# Patient Record
Sex: FEMALE | Race: WHITE | URBAN - METROPOLITAN AREA
[De-identification: names, ages, dates, MRNs, and addresses within clinical notes are randomized per-mention and may not be internally consistent; named-entity substitution may affect disease eponyms.]

---

## 2017-01-25 ENCOUNTER — OFFICE VISIT (OUTPATIENT)
Dept: FAMILY MEDICINE | Facility: CLINIC | Age: 47
End: 2017-01-25
Payer: MEDICAID

## 2017-01-25 VITALS
HEIGHT: 64 IN | SYSTOLIC BLOOD PRESSURE: 110 MMHG | TEMPERATURE: 98.2 F | WEIGHT: 134 LBS | OXYGEN SATURATION: 97 % | RESPIRATION RATE: 16 BRPM | BODY MASS INDEX: 22.88 KG/M2 | HEART RATE: 81 BPM | DIASTOLIC BLOOD PRESSURE: 70 MMHG

## 2017-01-25 DIAGNOSIS — Z12.4 SCREENING FOR MALIGNANT NEOPLASM OF CERVIX: ICD-10-CM

## 2017-01-25 DIAGNOSIS — R19.00 PELVIC MASS: ICD-10-CM

## 2017-01-25 DIAGNOSIS — R19.09 UMBILICAL MASS: Primary | ICD-10-CM

## 2017-01-25 DIAGNOSIS — T83.39XA OTHER MECHANICAL COMPLICATION OF INTRAUTERINE CONTRACEPTIVE DEVICE, INITIAL ENCOUNTER: ICD-10-CM

## 2017-01-25 DIAGNOSIS — R63.4 UNINTENTIONAL WEIGHT LOSS: ICD-10-CM

## 2017-01-25 PROCEDURE — G0145 SCR C/V CYTO,THINLAYER,RESCR: HCPCS | Performed by: PHYSICIAN ASSISTANT

## 2017-01-25 PROCEDURE — 87624 HPV HI-RISK TYP POOLED RSLT: CPT | Performed by: PHYSICIAN ASSISTANT

## 2017-01-25 PROCEDURE — 99204 OFFICE O/P NEW MOD 45 MIN: CPT | Performed by: PHYSICIAN ASSISTANT

## 2017-01-25 NOTE — PATIENT INSTRUCTIONS
[facility]  [address]oor 1  [address] [place] [address]  Appointments: [phone] (imaging)

## 2017-01-25 NOTE — NURSING NOTE
"Chief Complaint   Patient presents with     Abdominal Pain     per patient last 3 months before menstural patient gets fever, chills, hot flashes, vomiting and diarrhea       Initial /70 mmHg  Pulse 81  Temp(Src) 98.2  F (36.8  C) (Tympanic)  Resp 16  Ht 5' 4\" (1.626 m)  Wt 134 lb (60.782 kg)  BMI 22.99 kg/m2  SpO2 97%  LMP 01/24/2017 (Exact Date) Estimated body mass index is 22.99 kg/(m^2) as calculated from the following:    Height as of this encounter: 5' 4\" (1.626 m).    Weight as of this encounter: 134 lb (60.782 kg).  BP completed using cuff size: wellington Manriquez CMA      "

## 2017-01-25 NOTE — PROGRESS NOTES
SUBJECTIVE:                                                    Darrel Castañeda is a 46 year old female who presents to clinic today for the following health issues:    Abdominal Pain      Duration: x3 months     Description (location/character/radiation): abdominal pain right above belly button, per patient last 3 months before menstural patient gets fever, chills, hot flashes, vomiting and diarrhea and stomach bulges out right above belly button        Associated flank pain: None    Intensity:  severe    Accompanying signs and symptoms:        Fever/Chills: YES       Gas/Bloating: YES- a little        Nausea/vomitting: YES- vomiting        Diarrhea: YES       Dysuria or Hematuria: no     History (previous similar pain/trauma/previous testing): none    Precipitating or alleviating factors:       Pain worse with eating/BM/urination: can be with BM        Pain relieved by BM: no     Therapies tried and outcome: None    LMP:  1/24/17       PROBLEMS TO ADD ON...  Per patient a history fibroids in 2003?  IUD placed about 2004 may still have it does not remember having removed         HPI additional notes:    Chief Complaint   Patient presents with     Abdominal Pain     per patient last 3 months before menstural patient gets fever, chills, hot flashes, vomiting and diarrhea     Darrel presents today with odd constellation of symptoms that occur just prior to her period starting. This has happened the past 3-4 cycles. Reports f/c/s, n/v/d for 2-3 days prior to menses and symptoms completely & spontaneously resolve as soon as menses begin. It first occurred in Oct and patient initially thought she had the stomach flu, but has happened every cycle since. LMP started yesterday, sx occurred for 3 days prior. Patient reports periods are now a little heavier and last a little longer, but not drastically so. Patient also notes firm mass just above her belly button that enlarges prior to menses, then shrinks back down again.  "Denies vaginal discharge or irritation. Patient hasn't had a well woman exam since IUD was placed 2004, does not recall ever getting IUD removed. Patient has known hx of fibroids since ?2003. Patient also mentions unintentional weight loss since Oct, reports loss of 23# since then.     ROS:  Skin: negative  Eyes: negative  Ears/Nose/Throat: negative  Respiratory: No shortness of breath, dyspnea on exertion, cough, or hemoptysis  Cardiovascular: negative  Gastrointestinal: as above  Genitourinary: as above  Musculoskeletal: negative  Neurologic: negative  Psychiatric: negative  Hematologic/Lymphatic/Immunologic: as above  Endocrine: as above    Chart Review:  History   Smoking status     Current Every Day Smoker     Types: Cigarettes   Smokeless tobacco     Not on file       No active problems.    History reviewed. No pertinent past surgical history.     Family History   Problem Relation Age of Onset     Dementia Mother      CEREBROVASCULAR DISEASE Father      KIDNEY DISEASE Father      Problem list, Medication list, Allergies, Medical/Social/Surg hx reviewed in [employer], updated as appropriate.   OBJECTIVE:                                                    /70 mmHg  Pulse 81  Temp(Src) 98.2  F (36.8  C) (Tympanic)  Resp 16  Ht 5' 4\" (1.626 m)  Wt 134 lb (60.782 kg)  BMI 22.99 kg/m2  SpO2 97%  LMP 01/24/2017 (Exact Date)  Body mass index is 22.99 kg/(m^2).  GENERAL: thin, in no acute distress  EYES: no discharge, no injection  HENT: Mucous mebranes moist, normocephalic  ABDOMEN: soft, NTND. Small non-incarcerated, reducible hernia associated with superior umbilicus; diastasis recti.  MS: no gross deformities noted. No CVA tenderness. No paralumbar tenderness.  SKIN: no suspicious lesions, no rashes  - female: Vaginal mucosa pink. Cervix- unable to visualize fully, no IUD strings visible, blood discharge. No adnexal tenderness. No cervical motion tenderness. Rt ovary normal to palpation; unable to palpate " Lt ovary; multiple large masses on uterus.  NEURO: A&Ox3, sensation grossly intact.  PSYCH: Able to articulate logical thoughts. Affect is normal.    Diagnostic test results: none     ASSESSMENT/PLAN:                                                          ICD-10-CM    1. Umbilical mass R19.09 US Abdomen Complete   2. Unintentional weight loss R63.4    3. Pelvic mass R19.00 US Pelvic Complete w Transvaginal   4. Screening for malignant neoplasm of cervix Z12.4 Pap imaged thin layer screen with HPV - recommended age 30 - 65 years (select HPV order below)     HPV High Risk Types DNA Cervical     Unable to get full visualization of cervical os given resistance from presumed fibroid uterus, patient discomfort with speculum, and bloody discharge. Did get view of multiple angles of cervix and os and unable to visualize IUD strings. Additionally, made several sweeps around cervical os and through discharge in an attempt to retrieve them, but came up empty-handed. Discussed IUD may simply still be in place in uterus and just strings retracted, however high concern for IUD migration into uterine wall. Discussed risks of leaving IUD unattended. Given constellation of symptoms, most concerning for underlying malignancy. Reassured patient masses felt in uterus consistent with fibroids but feel pelvic US is of utmost importance to both evaluate for possible malignancy and attempt to locate IUD. Suspect umbilical mass is hernia; discussed benign nature given reducible status, discussed s/s and prompt evaluation at ED if she suspects it becomes incarcerated, and decision whether to undergo elective repair. Again though, given possibility of IUD migration and malignancy, will also check abd US. Patient instructed to schedule US at her earliest convenience. If no evidence of underlying malignancy, consider w/u for thyroid or underlying metabolic d/o, though would be odd to just have sx around menses only. Pap collected today,  though somewhat blindly.    Please see patient instructions for treatment details.    Follow up with radiology as referred.  45 minutes total spent on this encounter, >50% spent in direct communication with the patient.    Cristy Cash PA-C  [facility]

## 2017-01-25 NOTE — Clinical Note
[address]  [address]  [facility] [address]  [phone]      February 1, 2017    Darrel Castañeda  [address]  [facility] [address]    Dear Darrel,  We are happy to inform you that your PAP smear result from 1/25/17 is normal.  We are now able to do a follow up test on PAP smears. The DNA test is for HPV (Human Papilloma Virus). Cervical cancer is closely linked with certain types of HPV. Your result showed no evidence of high risk HPV.  Therefore we recommend you return in 5 years for your next pap smear and HPV test.  You will still need to return to the clinic every year for an annual exam and other preventive tests.  Please contact the clinic with any questions.  Sincerely,  Cristy Cash PA-C/lizzy

## 2017-01-25 NOTE — MR AVS SNAPSHOT
"              After Visit Summary   1/25/2017    Darrel Castañeda    MRN: [id number]           Patient Information     Date Of Birth          1970        Visit Information        Provider Department      1/25/2017 3:10 PM Cristy Cash PA-C [facility]        Today's Diagnoses     Screening for malignant neoplasm of cervix    -  1     Umbilical mass         Pelvic mass           [facility]  [address]oor [address]  [address] [place] [address]  Appointments: [phone] (imaging)          Follow-ups after your visit        Future tests that were ordered for you today     Open Future Orders        Priority Expected Expires Ordered    US Pelvic Complete w Transvaginal Routine  1/25/2018 1/25/2017    US Abdomen Complete Routine  1/25/2018 1/25/2017            Who to contact     If you have questions or need follow up information about today's clinic visit or your schedule please contact [facility] directly at [phone].  Normal or non-critical lab and imaging results will be communicated to you by [employer], letter or phone within 4 business days after the clinic has received the results. If you do not hear from us within 7 days, please contact the clinic through [employer] or phone. If you have a critical or abnormal lab result, we will notify you by phone as soon as possible.  Submit refill requests through [employer] or call your pharmacy and they will forward the refill request to us. Please allow 3 business days for your refill to be completed.          Additional Information About Your Visit        [employer] Information     [employer] lets you send messages to your doctor, view your test results, renew your prescriptions, schedule appointments and more. To sign up, go to [email or web address] . Click on \"Log in\" on the left side of the screen, which will take you to the Welcome page. Then click on \"Sign up " "Now\" on the right side of the page.     You will be asked to enter the access code listed below, as well as some personal information. Please follow the directions to create your username and password.     Your access code is: [email or web address]  Expires: 2017  4:24 PM     Your access code will  in 90 days. If you need help or a new code, please call your [place] clinic or [phone].        Care EveryWhere ID     This is your Care EveryWhere ID. This could be used by other organizations to access your [place] medical records  [id number]        Your Vitals Were     Pulse Temperature Respirations    81 98.2  F (36.8  C) (Tympanic) 16    Height BMI (Body Mass Index) Pulse Oximetry    5' 4\" (1.626 m) 22.99 kg/m2 97%    Last Period          2017 (Exact Date)         Blood Pressure from Last 3 Encounters:   17 110/70    Weight from Last 3 Encounters:   17 134 lb (60.782 kg)              We Performed the Following     HPV High Risk Types DNA Cervical     Pap imaged thin layer screen with HPV - recommended age 30 - 65 years (select HPV order below)        Primary Care Provider    None       No address on file        Thank you!     Thank you for choosing [facility]  for your care. Our goal is always to provide you with excellent care. Hearing back from our patients is one way we can continue to improve our services. Please take a few minutes to complete the written survey that you may receive in the mail after your visit with us. Thank you!             Your Updated Medication List - Protect others around you: Learn how to safely use, store and throw away your medicines at www.disposemymeds.org.      Notice  As of 2017  4:24 PM    You have not been prescribed any medications.      "

## 2017-01-30 LAB
COPATH REPORT: NORMAL
PAP: NORMAL

## 2017-01-31 LAB
FINAL DIAGNOSIS: NORMAL
HPV HR 12 DNA CVX QL NAA+PROBE: NEGATIVE
HPV16 DNA SPEC QL NAA+PROBE: NEGATIVE
HPV18 DNA SPEC QL NAA+PROBE: NEGATIVE
SPECIMEN DESCRIPTION: NORMAL

## 2017-02-01 ENCOUNTER — TELEPHONE (OUTPATIENT)
Dept: FAMILY MEDICINE | Facility: CLINIC | Age: 47
End: 2017-02-01

## 2017-02-01 DIAGNOSIS — Z53.8 UNSUCCESSFUL ATTEMPT TO REMOVE INTRAUTERINE DEVICE (IUD): ICD-10-CM

## 2017-02-01 DIAGNOSIS — Z97.5 UNSUCCESSFUL ATTEMPT TO REMOVE INTRAUTERINE DEVICE (IUD): ICD-10-CM

## 2017-02-01 DIAGNOSIS — A59.01 TRICHOMONAL VAGINITIS: Primary | ICD-10-CM

## 2017-02-01 DIAGNOSIS — D25.9 UTERINE LEIOMYOMA, UNSPECIFIED LOCATION: Primary | ICD-10-CM

## 2017-02-01 DIAGNOSIS — K42.9 UMBILICAL HERNIA WITHOUT OBSTRUCTION AND WITHOUT GANGRENE: ICD-10-CM

## 2017-02-01 PROBLEM — D25.1 INTRAMURAL LEIOMYOMA OF UTERUS: Status: ACTIVE | Noted: 2017-02-01

## 2017-02-01 RX ORDER — METRONIDAZOLE 500 MG/1
2000 TABLET ORAL ONCE
Qty: 4 TABLET | Refills: 1 | Status: SHIPPED | OUTPATIENT
Start: 2017-02-01 | End: 2017-02-01

## 2017-02-01 NOTE — TELEPHONE ENCOUNTER
Informed of trichomonas seen on recent Pap. Contacted patient regarding result, discussed pathophysiology, and that it may be triggering some of the symptoms she has around her period. Still recommend gyn f/u due to multiple fibroids, as this is likely contributing to heavier periods (in addition to inability to remove IUD in clinic here). Will treat with 2g dose Flagyl, given additional refill so partner may be treated as well. Will forward note to Tamika Garcia CNP, whom patient is seeing this afternoon regarding her fibroids and IUD (per patient request).

## 2017-02-01 NOTE — TELEPHONE ENCOUNTER
Informed patient of message below.   She agreed to schedule appt with Gynecologist.  Pt is also interested on referral for general surgery for hernia repair. Please advice on referral.  Ok to leave a detailed message with referral at home phone number.

## 2017-02-01 NOTE — TELEPHONE ENCOUNTER
Please contact patient regarding her recent US:    - Your abdominal US confirms a hernia just above your belly button. The hernia is fairly small (1 cm wide) and non-incarcerated (trapped), therefore we do not need to do anything about it right now. If you would like to discuss possible hernia repair surgery, I will gladly placed a referral to our general surgery department for you.    - Re: pelvic US: the IUD is inside the uterus but they are unable to tell if it's burrowed into the uterus wall. You do have several large fibroids, which is what we were expecting. However, these are distorting the shape of the uterus, so it was difficult to see if the IUD was sitting inside the uterus or was inside the wall of the uterus. I recommend you discuss both IUD removal (as it may be a bit difficult due the fibroids) and management of the fibroids with a gynecologist, I have placed a referral for you to [facility] near [place]. You may call  [phone] to schedule an appointment. There was a fluid-filled cyst on the right, the radiologist suspects this was a simple cyst from the ovary but they were unable to see either ovary on the scan due to the enlarged uterus.

## 2017-02-07 ENCOUNTER — TELEPHONE (OUTPATIENT)
Dept: FAMILY MEDICINE | Facility: CLINIC | Age: 47
End: 2017-02-07

## 2017-02-07 NOTE — TELEPHONE ENCOUNTER
Patient called clinic stating her [facility] does not have Rx for metroNIDAZOLE (FLAGYL) 500 MG tablet.  [facility] was called, they have received the Rx and just need her insurance information.   Patient was called and informed to contact pharmacy.

## 2017-02-09 ENCOUNTER — OFFICE VISIT (OUTPATIENT)
Dept: SURGERY | Facility: CLINIC | Age: 47
End: 2017-02-09

## 2017-02-09 VITALS
HEIGHT: 64 IN | DIASTOLIC BLOOD PRESSURE: 82 MMHG | OXYGEN SATURATION: 99 % | TEMPERATURE: 98.3 F | SYSTOLIC BLOOD PRESSURE: 108 MMHG | HEART RATE: 88 BPM | BODY MASS INDEX: 23.54 KG/M2 | WEIGHT: 137.9 LBS

## 2017-02-09 DIAGNOSIS — K42.9 UMBILICAL HERNIA WITHOUT OBSTRUCTION AND WITHOUT GANGRENE: Primary | ICD-10-CM

## 2017-02-09 ASSESSMENT — PAIN SCALES - GENERAL: PAINLEVEL: NO PAIN (0)

## 2017-02-09 NOTE — PROGRESS NOTES
"General Surgery Consultation Note    I was asked to see this patient for the following problem:    CC: Abdominal hernia    HPI:  Darrel Castañeda is a 46 year old female with a several month history of abdominal bulge and pain. The patient reports that fairly cyclically around her menstrual cycle she gets abdominal pain just above the umbilicus, fevers, chills and nausea. She reports noticing the bulge in October 2016 and has not noticed any significant change in size. Intermittently it becomes hard and more painful. She denies have obstructive symptoms.     Past Medical History:  History reviewed. No pertinent past medical history.  Patient Active Problem List   Diagnosis     Intramural leiomyoma of uterus     Umbilical hernia without obstruction and without gangrene       Surgical History:  History reviewed. No pertinent past surgical history.    Medications:  Prior to Admission medications    Not on File     No current outpatient prescriptions on file.       Allergies:  No Known Allergies    Social History:  Social History     Social History     Marital Status: Single     Spouse Name: N/A     Number of Children: N/A     Years of Education: N/A     Social History Main Topics     Smoking status: Current Every Day Smoker     Types: Cigarettes     Smokeless tobacco: None     Alcohol Use: Yes      Comment: socially      Drug Use: Yes      Comment: marijuana      Sexual Activity:     Partners: Male     Birth Control/ Protection: Condom     Other Topics Concern     None     Social History Narrative       Family History:  Family History   Problem Relation Age of Onset     Dementia Mother      CEREBROVASCULAR DISEASE Father      KIDNEY DISEASE Father        Review of Systems:  A 14 point review of systems was reviewed in our paper questionnaire.     Physical Examination:  Vital Signs: /82 mmHg  Pulse 88  Temp(Src) 98.3  F (36.8  C) (Oral)  Ht 5' 4\"  Wt 137 lb 14.4 oz  BMI 23.66 kg/m2  SpO2 99%  LMP 01/24/2017 " (Exact Date)  HEENT: NCAT; MMM; EOMSI; PERRL  Lungs: Breathing unlabored  Abdomen: soft/nontender/nondistended, small 1-2cm supraumbilical hernia palpated, soft, no evidence of bowel within    Imaging:  IMPRESSION: 1 cm supraumbilical hernia containing approximately 2 cm  of fat. No herniated bowel.      Assessment:  47 yo female with small supraumbilical hernia    Discussion of Risks:   The risks of hernia repair were reviewed with the patient.    These risks combine the risks of abdominal surgery and the risks of hernia repair, including mesh implantation, and were described to the patient as follows:    Abdominal surgery risks:    These include, but are not limited to, death, myocardial infarction, pneumonia, urinary tract infection, deep venous thrombosis with or without pulmonary embolus, abdominal infection from bowel injury or abscess, bowel obstruction, wound infection, and bleeding.    Hernia repair risks:    Food and Drug Administration Comments on Hernia Repair Surgery and Mesh Implantation.    http://www.fda.gov/MedicalDevices/ProductsandMedicalProcedures/ImplantsandProsthetics/HerniaSurgicalMesh/default.htm      Hernia Repair Complications    Based on FDA s analysis of medical device adverse event reports and of peer-reviewed, scientific literature, the most common adverse events for all surgical repair of hernias--with or without mesh--are pain, infection, hernia recurrence, scar-like tissue that sticks tissues together (adhesion), blockage of the large or small intestine (obstruction), bleeding, abnormal connection between organs, vessels, or intestines (fistula), fluid build-up at the surgical site (seroma), and a hole in neighboring tissues or organs (perforation).    The most common adverse events following hernia repair with mesh are pain, infection, hernia recurrence, adhesion, and bowel obstruction. Some other potential adverse events that can occur following hernia repair with mesh are mesh  migration and mesh shrinkage (contraction).    Many complications related to hernia repair with surgical mesh that have been reported to the FDA have been associated with recalled mesh products that are no longer on the market. Pain, infection, recurrence, adhesion, obstruction, and perforation are the most common complications associated with recalled mesh. In the FDA s analysis of medical adverse event reports to the FDA, recalled mesh products were the main cause of bowel perforation and obstruction complications.    Please refer to the recall notices here and here for more information if you have recalled mesh. For more information on the recalled products, please visit the FDA Medical Device Recall website. Please visit the Medical & Radiation Emitting Device Database to search a specific type of surgical mesh.    If you are unsure about the specific mesh  and brand used in your surgery and have questions about your hernia repair, contact your surgeon or the facility where your surgery was performed to obtain the information from your medical record.         Plan:  -PAC visit pre-operatively  -[place] [facility]  -Discussed that the hernia is unlikely to be contributing to her cyclical complaints, fevers, chills, etc and repairing of the hernia will not resolve those symptoms. Further investigation as to the etiology of these symptoms would be beneficial.   -Discussed that the patient was increased risk of post-operative complications, repair failure, and infection due to her current smoking habits. Recommend complete smoking cessation prior to repair.       Gilma Dela Cruz MD      Physician Attestation  I, Mo Field, saw and evaluated this patient as part of a shared visit.  I have reviewed and discussed with the advanced practice provider and/or resident their history, physical and plan.    I personally reviewed the vital signs, medications, labs and imaging.    My key history or  physical exam findings: has small supraumbilical hernia; seems separate from primary umbilical location.  Will likely need mesh; not dangerous to wait on this due to its contents (fat).    Key management decisions made by me: open umbilical hernia repair.  Same day surgery; 45 minutes; ASC.  GETA.  PAC clinic for preop H&P.    Mo Field MD  Date of Service (when I saw the patient): 02/09/2017

## 2017-02-09 NOTE — Clinical Note
Darrel Castañeda  [address]  [facility] [address]      SURGERY PACKET            Your surgery is scheduled:    Date: Call Farhan - [phone] after you have ceased all Nicotine use.  ________________________________    Time: Per Farhan  ________________________________    Please arrive at:  2 hours  ______________________    Surgeon's Name: Uri Field  _______________________        Pre-Op Physical Fax Numbers:          [facility] Pre-Admissions  Fax: [phone]  Phone: [phone]        Your surgery is located at:  [facility]  [address] [place] [address]  [phone]       Before Your Surgery  For Patients and Visitors at [facility]    Welcome  As you get ready for surgery, you may have a lot of questions.  This brochure will help you know what to expect before and after surgery.  You and your family are the most important members of your health care team.  You will need to take an active role in your care.    Be sure to ask questions and learn all that you can about your surgery.  If you have any safety concerns, we urge you to tell a nurse as soon as possible.   This brochure is for information only.  It does not replace the advice of your doctor.  Always follow your doctor's advice.    Please tell us if you need a .    GETTING READY FOR SURGERY  Always follow your surgeon's instructions.  If you don't, your surgery could be canceled.  Please use the following checklist.    Within 30 Days of Surgery:    Have a pre-surgery physical exam with your family doctor or partner.    If you use a [facility], all of your information from the pre-op physical will be in the [employer] computer system.    Ask the doctor to send all of your results to the hospital before the surgery.  The doctor also may ask you to bring the results with you on the day of surgery or you can fax them to [phone].  Tell the doctor if:    You are allergic to  latex or rubber  (Latex and rubber gloves are often used in medical care).    You are taking any medicines (including aspirin), vitamins (Vitamin E, Fish Oil, Omegas) or herbal products.  You will need to stop taking some medicines before surgery.    You have any medical problems (allergies, diabetes or heart disease, for example).    You have a pacemaker or an AICD (automatic implanted cardiac defibrillator).  If you do, please bring the ID card with you on the day of surgery.    You are a smoker.  People who smoke have a higher risk of infection after surgery.  Ask your doctor how you can quit smoking.  Within 7 days of Surgery:    Pre-register with the hospital.  Please use the hospital's phone number listed on the first page of this brochure.  Or, to register online, go to [email or web address].      Prior to your surgical procedure, a nurse will be contacting you to obtain a health history ([phone]).  Additionally, someone from the Admissions Department will also contact you for preregistration ([phone]).      Call your insurance company.  Ask if you need pre-approval for your surgery.  If you do not have insurance, please let us know.      Arrange for someone to drive you home after surgery.  If you will have same-day surgery, you may not drive or take public transportation home by yourself.    Arrange for someone to stay with you for 24 hours after you go home.  This person must be a responsible adult (ie- Family member or friend).  The Day Before Surgery:     Call your surgeon if there are any changes in your health.  This includes signs of a cold or flu (such as a sore throat, runny nose, cough, rash or fever).    Do not smoke, drink alcohol or take over-the-counter medicine (unless your surgeon tells you to) for 24 hours before and after surgery.    If you take prescribed drugs:  You may need to stop them until after the surgery.  Follow your doctor's orders.  You may resume Aspirin and/or blood  thinners after your surgery as directed by your physician/surgeon.    NO FOOD OR DRINK AFTER MIDNIGHT.  Follow your surgeon's orders for eating and drinking.  You need to have an empty stomach before surgery.  This will make the surgery as safe as possible.  If you don't follow your doctor's orders, your surgery could be changed to another date.  A nurse will call you within a few days of surgery to go over these and other instructions.  If you do not hear from them, please call them at [phone]  The Day of Surgery:    Take a shower or bath the night before and the morning of surgery.  Use antiseptic soap or the soap your surgeon gave you.  Gently clean the skin.  Do not shave or scrub your surgery site.    Please remove deodorant, cologne, scented lotion, makeup, nail polish and jewelry (including rings and body piercings).  If you wear artificial nails, please remove at least one nail before coming to the hospital.    Wear clean, loose clothing to the hospital.    Bring these items to the hospital:  1. Your insurance card.  2. Money for parking and co-pays (for medicines or the surgery), if needed.   3. A list of all the medicines you take.  Include vitamins, minerals, herbs and over-the-counter drugs.  Note any drug allergies.  4. A copy of your advance health care directive, if you have one.  This tells us what treatment you would want -- and who would make health care decisions -- if you could no longer speak for yourself.  You may request this form in advance or download it from [email or web address].  5. A case for any glasses, contact lenses, hearing aids or dentures.  6. Your inhaler or CPAP machine, if you use these at home.  Leave extra cash, jewelry and other valuables at home.    When You Arrive:  When you get to the hospital, you will:    Check in.  If you are under age 18, you must be with a parent or legal guardian.    Sign consent forms, if you haven't already.  These forms state that you  know the risks and benefits of surgery.  When you sign the forms, you give us permission to do the surgery.  Do not sign them unless you understand what will happen during and after your surgery.  If you have any questions about your surgery, ask to speak with your doctor before you sign the forms.  If you don't understand the answers, ask again.    Receive a copy of the Patients Bill of Rights.  If you do not receive a copy, please ask for one.    Change into hospital clothes.  Your belongings will be placed in a bag.  We will return them to you after surgery.    Meet with the anesthesia provider.  He or she will tell you what kind of anesthesia (medicine) will be used to keep you comfortable during surgery.  Remember: It's okay to remind doctors and nurses to wash their hands before touching you.   In most cases, your surgeon will use a marker to write his or her initials on the surgery site.  This ensures that the exact site is operated on.  For safety reasons, we will ask you the same questions many times.  For example, we may ask your name and birth date over and over again.  Friends and family can stay with you until it's time for surgery.  While you're in surgery, they will be in the waiting area.  Please note that cell phones are not allowed in some patient care areas.  If you have questions about what will happen in the operating room, talk to your care team.  After Surgery:    We will move you to a recovery room where we will watch you closely.  If you have any pain or discomfort, tell your nurse.  He or she will try to make you comfortable.      If you are staying overnight we will move you to your hospital room after you are awake.    If you are going home we will move you to another room.  Friends and family may be able to join you.  The length of time you spend in recovery depends on the type of medicine you received, your medical condition, and the type of surgery you had.  Dealing with pain:  A nurse  "will check your comfort level often during your stay.  He or she will work with you to manage your pain.  Remember:    All pain is real.  There are many ways to control pain.  We can help you decide what works best for you.    Ask for pain medicine when you need it.  Don't try to \"tough it out\" -- this can make you feel worse.  Always take your medicine as ordered.    Medicine doesn't work the same for everyone.  If your medicine isn't working tell your nurse.  There may be other medicines or treatments we can try.  Going Home:  We will let you know when you're ready to leave the hospital.  Before you leave, we will tell you how to care for yourself at home and prevent infections.  If you do not understand something, please say so.  We will answer any questions you have.  We will then help you get ready to leave.  You must have an adult with you for the first 24 hours after you leave the hospital. Take it easy when you get home.  You will need some time to recover -- you may be more tired than you realize at first.  Rest and relax for at least the first 24 hours at home.  You'll feel better and heal faster if you take good care of yourself.                      Pre-Operative Surgery Scrub    Purpose:   The skin harbors a large variety of bacteria, both infectious and noninfectious.  Showering with an antiseptic soap prior to an invasive procedure will decrease the number of transient and resident (good and bad) bacteria that is normally found on the skin.    Procedure:      Shower or bathe with 1/2 of the bottle of antiseptic soap (enclosed) the evening before and 1/2 of the bottle the morning of surgery (bathing the day of the procedure is most important).       Apply the soap at full strength (use the entire bottle).  Gently clean the skin, rinse, and dry with a clean towel that is freshly laundered (out of the dryer) and then put on clean clothing that is freshly laundered.        We have given you information " regarding pre-op showering.  We recommend that patients wash with an antiseptic soap prior to surgery.  This cleanser will be given to you at the clinic or mailed to your home.  It is advised that you liberally wash the specific area surgery area the night before, and again in the morning before the surgery.  Do not apply lotion afterward.  We would like to keep the skin as clean as possible.    Thank you for following these important instructions.      You have been scheduled for surgery and we would like to give you some information that will assist in helping get the best possible outcome.      Before Surgery:   If for any reason you decide not to have the surgery, please contact our office.  We can easily cancel or reschedule the procedure. Please call the  at [phone].      Any pain related to the surgery that occurs before the surgery needs to be reported and managed by your primary care or referring doctor.      Please keep in mind that the time of surgery is subject to change.  Make sure you have nothing to eat or drink after midnight.  If your surgery is later in the afternoon, this recommendation might change, but not until the day before surgery after the actual time of the surgery has been established.    After Surgery:  When you are discharged from the recovery room, the nurses will review instructions with you and your caregiver.      Please wash your hands every time you touch the wound or change bandages or dressings.      Do not submerge the wound in water.  You may not use a bathtub or hot tub until the wound is closed.  The wait time frame is generally 2-3 weeks but any open area can be a source of incoming bacteria, so it is better to be on the safe side and avoid the tub until your wound is fully healed.      You may take a shower 24 hours after surgery.  Double check with your surgeon if it is ok for water to run over a wound, whether it has been sutured, stapled, glued or is open.   You may gently wash the wound using the antiseptic soap provided for your pre-surgery showering (do not use a washcloth).  Any mild soap will work as well.      Many surgical wounds will have small white strips of tape on them called Steri Strips.  Do not remove these.  The edges will curl and fall off within 7-10 days with normal showering.      If you are going home with sutures (stitches) or staples, you must return to the clinic to have them taken out, usually within 1-2 weeks.      Signs and symptoms of infection include:  1. Fever, temperature over 101.5 ' F  2. Redness  3. Swelling  4. Increasing pain  5. Green or yellow drainage which may or may not have a foul odor.    Symptoms of infection need to be reported to your surgery office. Please call the nurse at [phone], Option #3.    If you or your  are deaf or hard of hearing, or prefer a language other than English, please let us know.  We have many free services, including interpreters and other aids to help you communicate. You may ask for help  through any member of your care team or by calling Language Services at [phone], option 2.

## 2017-02-09 NOTE — NURSING NOTE
"Chief Complaint   Patient presents with     Consult     Umbilical hernia- Internal [facility] referral , imaging in [facility]       Filed Vitals:    02/09/17 1056   BP: 108/82   Pulse: 88   Temp: 98.3  F (36.8  C)   TempSrc: Oral   Height: 5' 4\"   Weight: 137 lb 14.4 oz   SpO2: 99%       Body mass index is 23.66 kg/(m^2).  Mahin Evans CMA                      "

## 2017-02-09 NOTE — NURSING NOTE
This patient is having Open Umbilical Hernia Repair by Dr. Field.    The following handouts were reviewed with the patient :  Before Your Surgery, Patient Checklist, Preop Recommendations Quick Reference Guide, History and Physical, Blood Bank Preadmission Order, Medications to Avoid, Shower or Bathing Before Surgery, Powerful Choices and [place] Advance Health Care Directive.  Questions were addressed and understanding of content was verbalized.  Contact information was provided.      Pt to call Farhan for surgery date and PAC.  Pt must cease all nicotine use prior to scheduling surgery.    Time with pt - 15 min    Shelby Wolf RN

## 2017-02-09 NOTE — Clinical Note
2/9/2017       RE: Darrel Castañeda  [address]  [facility] [address]     Dear Colleague,    Thank you for referring your patient, Darrel Castañeda, to the [facility] GENERAL SURGERY at [facility]. Please see a copy of my visit note below.    General Surgery Consultation Note    I was asked to see this patient for the following problem:    CC: Abdominal hernia    HPI:  Darrel Castañeda is a 46 year old female with a several month history of abdominal bulge and pain. The patient reports that fairly cyclically around her menstrual cycle she gets abdominal pain just above the umbilicus, fevers, chills and nausea. She reports noticing the bulge in October 2016 and has not noticed any significant change in size. Intermittently it becomes hard and more painful. She denies have obstructive symptoms.     Past Medical History:  History reviewed. No pertinent past medical history.  Patient Active Problem List   Diagnosis     Intramural leiomyoma of uterus     Umbilical hernia without obstruction and without gangrene       Surgical History:  History reviewed. No pertinent past surgical history.    Medications:  Prior to Admission medications    Not on File     No current outpatient prescriptions on file.       Allergies:  No Known Allergies    Social History:  Social History     Social History     Marital Status: Single     Spouse Name: N/A     Number of Children: N/A     Years of Education: N/A     Social History Main Topics     Smoking status: Current Every Day Smoker     Types: Cigarettes     Smokeless tobacco: None     Alcohol Use: Yes      Comment: socially      Drug Use: Yes      Comment: marijuana      Sexual Activity:     Partners: Male     Birth Control/ Protection: Condom     Other Topics Concern     None     Social History Narrative       Family History:  Family History   Problem Relation Age of Onset     Dementia Mother      CEREBROVASCULAR DISEASE Father      KIDNEY  "DISEASE Father        Review of Systems:  A 14 point review of systems was reviewed in our paper questionnaire.     Physical Examination:  Vital Signs: /82 mmHg  Pulse 88  Temp(Src) 98.3  F (36.8  C) (Oral)  Ht 5' 4\"  Wt 137 lb 14.4 oz  BMI 23.66 kg/m2  SpO2 99%  LMP 01/24/2017 (Exact Date)  HEENT: NCAT; MMM; EOMSI; PERRL  Lungs: Breathing unlabored  Abdomen: soft/nontender/nondistended, small 1-2cm supraumbilical hernia palpated, soft, no evidence of bowel within    Imaging:  IMPRESSION: 1 cm supraumbilical hernia containing approximately 2 cm  of fat. No herniated bowel.      Assessment:  47 yo female with small supraumbilical hernia    Discussion of Risks:   The risks of hernia repair were reviewed with the patient.    These risks combine the risks of abdominal surgery and the risks of hernia repair, including mesh implantation, and were described to the patient as follows:    Abdominal surgery risks:    These include, but are not limited to, death, myocardial infarction, pneumonia, urinary tract infection, deep venous thrombosis with or without pulmonary embolus, abdominal infection from bowel injury or abscess, bowel obstruction, wound infection, and bleeding.    Hernia repair risks:    Food and Drug Administration Comments on Hernia Repair Surgery and Mesh Implantation.    http://www.fda.gov/MedicalDevices/ProductsandMedicalProcedures/ImplantsandProsthetics/HerniaSurgicalMesh/default.htm      Hernia Repair Complications    Based on FDA s analysis of medical device adverse event reports and of peer-reviewed, scientific literature, the most common adverse events for all surgical repair of hernias--with or without mesh--are pain, infection, hernia recurrence, scar-like tissue that sticks tissues together (adhesion), blockage of the large or small intestine (obstruction), bleeding, abnormal connection between organs, vessels, or intestines (fistula), fluid build-up at the surgical site (seroma), and a " hole in neighboring tissues or organs (perforation).    The most common adverse events following hernia repair with mesh are pain, infection, hernia recurrence, adhesion, and bowel obstruction. Some other potential adverse events that can occur following hernia repair with mesh are mesh migration and mesh shrinkage (contraction).    Many complications related to hernia repair with surgical mesh that have been reported to the FDA have been associated with recalled mesh products that are no longer on the market. Pain, infection, recurrence, adhesion, obstruction, and perforation are the most common complications associated with recalled mesh. In the FDA s analysis of medical adverse event reports to the FDA, recalled mesh products were the main cause of bowel perforation and obstruction complications.    Please refer to the recall notices here and here for more information if you have recalled mesh. For more information on the recalled products, please visit the FDA Medical Device Recall website. Please visit the Medical & Radiation Emitting Device Database to search a specific type of surgical mesh.    If you are unsure about the specific mesh  and brand used in your surgery and have questions about your hernia repair, contact your surgeon or the facility where your surgery was performed to obtain the information from your medical record.         Plan:  -PAC visit pre-operatively  -Plan for hernia repair at [facility]  -Discussed that the hernia is unlikely to be contributing to her cyclical complaints, fevers, chills, etc and repairing of the hernia will not resolve those symptoms. Further investigation as to the etiology of these symptoms would be beneficial.   -Discussed that the patient was increased risk of post-operative complications, repair failure, and infection due to her current smoking habits. Recommend complete smoking cessation prior to repair.       Gilma Dela Cruz MD      Physician Attestation  I,  Mo Field, saw and evaluated this patient as part of a shared visit.  I have reviewed and discussed with the advanced practice provider and/or resident their history, physical and plan.    I personally reviewed the vital signs, medications, labs and imaging.    My key history or physical exam findings: has small supraumbilical hernia; seems separate from primary umbilical location.  Will likely need mesh; not dangerous to wait on this due to its contents (fat).    Key management decisions made by me: open umbilical hernia repair.  Same day surgery; 45 minutes; ASC.  GETA.  PAC clinic for preop H&P.    Mo Field MD  Date of Service (when I saw the patient): 02/09/2017

## 2017-02-14 ENCOUNTER — OFFICE VISIT (OUTPATIENT)
Dept: OBGYN | Facility: CLINIC | Age: 47
End: 2017-02-14
Attending: OBSTETRICS & GYNECOLOGY
Payer: MEDICAID

## 2017-02-14 VITALS
DIASTOLIC BLOOD PRESSURE: 77 MMHG | HEART RATE: 66 BPM | WEIGHT: 136.9 LBS | SYSTOLIC BLOOD PRESSURE: 111 MMHG | BODY MASS INDEX: 23.37 KG/M2 | HEIGHT: 64 IN

## 2017-02-14 DIAGNOSIS — Z30.432 ENCOUNTER FOR IUD REMOVAL: Primary | ICD-10-CM

## 2017-02-14 PROCEDURE — 58300 INSERT INTRAUTERINE DEVICE: CPT | Mod: ZF | Performed by: OBSTETRICS & GYNECOLOGY

## 2017-02-14 PROCEDURE — 58301 REMOVE INTRAUTERINE DEVICE: CPT | Mod: ZF | Performed by: OBSTETRICS & GYNECOLOGY

## 2017-02-14 PROCEDURE — 99213 OFFICE O/P EST LOW 20 MIN: CPT | Mod: ZF

## 2017-02-14 PROCEDURE — 25000125 ZZHC RX 250: Mod: ZF

## 2017-02-14 ASSESSMENT — ENCOUNTER SYMPTOMS: DYSURIA: 0

## 2017-02-14 NOTE — LETTER
"2017       RE: Darrel Castañeda  [address]  [facility] [address]     Dear Colleague,    Thank you for referring your patient, Darrel Castañeda, to the [facility] at [facility]. Please see a copy of my visit note below.    SUBJECTIVE: Darrel Castañeda is a 46 year old , requests IUD removal and re-insertion.    Verification of Procedure:  Just prior the procedure through verbal and active participation of team members, I verified:     Initials   Patient Name mkm   Patient  mkm   Procedure to be performed mkm     Consent:  Risks, benefits of treatment, and alternative options for contraception were discussed.  Patient's questions were elicited and answered.  Written consent was obtained and scanned into medical record.     OBJECTIVE: /77  Pulse 66  Ht 1.626 m (5' 4\")  Wt 62.1 kg (136 lb 14.4 oz)  LMP 2017 (Exact Date)  BMI 23.5 kg/m2    Pelvic Exam:  EG/BUS: Normal genital architecture without lesions, erythema or abnormal secretions. Bartholin's, Urethra, [place]'s glands are normal.   Vagina: moist, pink, rugae with creamy, white and odorlesssecretions  Cervix: no lesions  Uterus: anteverted,    Adnexa: Within normal limits and No masses, nodularity, tenderness  Rectum: anus normal      PROCEDURE NOTE  --  Mirena Removal and Re-Insertion    Reason for Removal and Re-insertion:  contraception.    Pregnancy test: N/a    Counseling:  Patient counseled on efficacy, benefits, risks, potential side effects of IUD.  Insertion procedure and risk of infection, perforation, and spontaneous expulsion reviewed.   Advised to plan removal and/or replacement of IUD in 6 years from today or when desired.       Darrel  was not pre-medicated.    Mirena IUD removal procedure:    Cervix was visualized with a medium Graves speculum and prepped with. The strings were grasped with a uterine packing forceps and the IUD removed with gentle " traction.  No resistance was encountered.  The Mirena IUD was noted to be intact.  There were no complications.  Darrel Castañeda reported no pain.  There was no bleeding.     Re-insertion procedure  Under sterile technique, cervix was visualized with a medium Graves speculum and prepped with Betadine solution. The uterus was sounded to 3 cm. The Mirena IUD insertion apparatus was prepared and placed through the cervix without significant resistance and deployed at the fundus in the usual fashion. The strings were trimmed 3 cm from the external os.      Device Lot #: [id number]  Device Expiration Date: 920     EBL:  Minimal     Complications:  None      Darrel Castañeda tolerated procedure well.    PLAN:      Written information on IUD use reviewed and given.  Symptoms to report reviewed. To report heavy bleeding, severe cramping, or abnormal vaginal discharge.  May take Ibuprofen 400-800 mg PO TID PRN or Naproxen 500 mg PO BID for cramping. Reminded to check for IUD strings every month.  Patient has been counseled to use backup birth control method for 1 week.  Return to clinic in 4-6 weeks for string check. Darrel Castañeda  verbalized understanding of instructions.    MD Adriana      Progress Note    SUBJECTIVE:  Darrel is a  46 YOF with a history of uterine fibroids who comes to clinic today to discuss abdominal complaints related to her periods, as well as to have her Mirena IUD removed. She notes that for the last 3 months she's had fevers/chills, abdominal pain and vomiting in the 3 days preceding her menstrual periods. The abdominal pain and vomiting are debilitating, but resolve after her periods begin. She has never had symptoms like this before. She has not had any menorrhalgia or changes in her menstrual cycle flow, which are otherwise regular. No spotting or bleeding between cycles. She tried using ASA on one occassion but threw it up.    For the IUD, she had a Merena IUD placed in , and  would like to have it removed today. She had a transvaginal US on  which showed the IUD within the uterus, but an attempt to remove the IUD was unsuccessful.     Menstrual History:  Menstrual History 2017   LAST MENSTRUAL PERIOD 2017       Last    Lab Results   Component Value Date    PAP OTHER-NIL, See Result 2017     History of abnormal Pap smear: NO - age 30-65 PAP every 5 years with negative HPV co-testing recommended    Last   Lab Results   Component Value Date    HPV16 Negative 2017     Last   Lab Results   Component Value Date    HPV18 Negative 2017     Last   Lab Results   Component Value Date    HRHPV Negative 2017     Last Colonoscopy:  No results found for this or any previous visit.    HISTORY:  Prescription Medications as of 2017             ASPIRIN PO         Allergies   Allergen Reactions     Shellfish-Derived Products Anaphylaxis       There is no immunization history on file for this patient.    Obstetric History       T2      TAB0   SAB0   E0   M0   L2      No past medical history on file.  No past surgical history on file.  Family History   Problem Relation Age of Onset     Dementia Mother      CEREBROVASCULAR DISEASE Father      KIDNEY DISEASE Father      Social History     Social History     Marital status: Single     Spouse name: N/A     Number of children: N/A     Years of education: N/A     Social History Main Topics     Smoking status: Current Every Day Smoker     Types: Cigarettes     Smokeless tobacco: Not on file     Alcohol use Yes      Comment: socially      Drug use: Yes      Comment: marijuana      Sexual activity: Yes     Partners: Male     Birth control/ protection: Condom     Other Topics Concern     Not on file     Social History Narrative       Review of Systems     Genitourinary:  Negative for dysuria, vaginal discharge, voiding less frequently, menstrual changes and vaginal dryness.       EXAM:  Blood pressure 111/77,  "pulse 66, height 1.626 m (5' 4\"), weight 62.1 kg (136 lb 14.4 oz), last menstrual period 01/24/2017. Body mass index is 23.5 kg/(m^2).  General - pleasant female in no acute distress.    Pelvic Exam:  EG/BUS: Normal genital architecture without lesions, erythema or abnormal secretions Bartholin's, Urethra, [place]'s normal  Urethral meatus: normal  Urethra: no masses, tenderness, or scarring  Vagina: moist, pink, rugae with creamy, white and odorless  secretions  Cervix: no lesions. IUD strings visualized at cervical os.  Uterus: anteverted,  and nodular 9 wk size  Adnexa: Within normal limits and No masses, nodularity, tenderness    A/P  1. Dysmenorrhea: Although her abdominal pain/symptoms start prior to her menstrual periods, which is not classical for dysmenorrhea, we discussed that they may indeed be related to the increase in prostaglandin production that occurs with menstrual periods. Initially NDAIDs were discussed as a possible remedy, although given that we will place an IUD today (see below), NSAIDs may not be necessary, as the IUD is likely to resolve her symptoms.     2. IUD: We discussed the risks and benefits of various birth control options for her, and, given her status as a current smoker, it was decided to place a new Mirena IUD. Risks and benefits of the Mirena were discussed, and it was placed.    I, Gasper Sadler MS3, scribed this note on behalf of Dr. Pantoja.    ASSESSMENT:  S/p IUD removal and re-insertion     PLAN:    Return to clinic in 4-6 weeks for string check.  MD Adriana        Erroneous note    Again, thank you for allowing me to participate in the care of your patient.      Sincerely,    Iwona Pantoja MD      "

## 2017-02-14 NOTE — PROGRESS NOTES
"SUBJECTIVE: Darrel Castañeda is a 46 year old , requests IUD removal and re-insertion.    Verification of Procedure:  Just prior the procedure through verbal and active participation of team members, I verified:     Initials   Patient Name mkm   Patient  mkm   Procedure to be performed mksirisha     Consent:  Risks, benefits of treatment, and alternative options for contraception were discussed.  Patient's questions were elicited and answered.  Written consent was obtained and scanned into medical record.     OBJECTIVE: /77  Pulse 66  Ht 1.626 m (5' 4\")  Wt 62.1 kg (136 lb 14.4 oz)  LMP 2017 (Exact Date)  BMI 23.5 kg/m2    Pelvic Exam:  EG/BUS: Normal genital architecture without lesions, erythema or abnormal secretions. Bartholin's, Urethra, [place]'s glands are normal.   Vagina: moist, pink, rugae with creamy, white and odorlesssecretions  Cervix: no lesions  Uterus: anteverted,    Adnexa: Within normal limits and No masses, nodularity, tenderness  Rectum: anus normal      PROCEDURE NOTE  --  Mirena Removal and Re-Insertion    Reason for Removal and Re-insertion:  contraception.    Pregnancy test: N/a    Counseling:  Patient counseled on efficacy, benefits, risks, potential side effects of IUD.  Insertion procedure and risk of infection, perforation, and spontaneous expulsion reviewed.   Advised to plan removal and/or replacement of IUD in 6 years from today or when desired.       Darrel  was not pre-medicated.    Mirena IUD removal procedure:    Cervix was visualized with a medium Graves speculum and prepped with. The strings were grasped with a uterine packing forceps and the IUD removed with gentle traction.  No resistance was encountered.  The Mirena IUD was noted to be intact.  There were no complications.  Darrel Castañeda reported no pain.  There was no bleeding.     Re-insertion procedure  Under sterile technique, cervix was visualized with a medium Graves speculum and prepped with Betadine " solution. The uterus was sounded to 3 cm. The Mirena IUD insertion apparatus was prepared and placed through the cervix without significant resistance and deployed at the fundus in the usual fashion. The strings were trimmed 3 cm from the external os.      Device Lot #: [id number]  Device Expiration Date: 092019     EBL:  Minimal     Complications:  None      Darrel Castañeda tolerated procedure well.    PLAN:      Written information on IUD use reviewed and given.  Symptoms to report reviewed. To report heavy bleeding, severe cramping, or abnormal vaginal discharge.  May take Ibuprofen 400-800 mg PO TID PRN or Naproxen 500 mg PO BID for cramping. Reminded to check for IUD strings every month.  Patient has been counseled to use backup birth control method for 1 week.  Return to clinic in 4-6 weeks for string check. Darrel Castañeda  verbalized understanding of instructions.    MD Adriana

## 2017-02-14 NOTE — MR AVS SNAPSHOT
After Visit Summary   2017    Darrel Castañeda    MRN: [id number]           Patient Information     Date Of Birth          1970        Visit Information        Provider Department      2017 1:45 PM Iwona Pantoja MD [facility]        Today's Diagnoses     Encounter for IUD removal    -  1      Care Instructions      IUD pamphlet reviewed and given to patient.        Follow-ups after your visit        Who to contact     Please call your clinic at [phone] to:    Ask questions about your health    Make or cancel appointments    Discuss your medicines    Learn about your test results    Speak to your doctor   If you have compliments or concerns about an experience at your clinic, or if you wish to file a complaint, please contact [facility] Relations at [phone] or email us at [email or web address]         Additional Information About Your Visit        MyChart Information     [employer] is an electronic gateway that provides easy, online access to your medical records. With [employer], you can request a clinic appointment, read your test results, renew a prescription or communicate with your care team.     To sign up for [employer] visit the website at [email or web address]   You will be asked to enter the access code listed below, as well as some personal information. Please follow the directions to create your username and password.     Your access code is: [email or web address]  Expires: 2017  4:24 PM     Your access code will  in 90 days. If you need help or a new code, please contact your [facility] or call [phone] for assistance.        Care EveryWhere ID     This is your Care EveryWhere ID. This could be used by other organizations to access your [place] medical records  [id number]        Your Vitals Were     Pulse Height Last Period BMI (Body Mass Index)           "66 1.626 m (5' 4\") 01/24/2017 (Exact Date) 23.5 kg/m2         Blood Pressure from Last 3 Encounters:   02/14/17 111/77   02/09/17 108/82   01/25/17 110/70    Weight from Last 3 Encounters:   02/14/17 62.1 kg (136 lb 14.4 oz)   02/09/17 62.6 kg (137 lb 14.4 oz)   01/25/17 60.8 kg (134 lb)              We Performed the Following     Insertion of IUD [82779]     Remove IUD [00021]        Primary Care Provider    None       No address on file        Thank you!     Thank you for choosing [facility]  for your care. Our goal is always to provide you with excellent care. Hearing back from our patients is one way we can continue to improve our services. Please take a few minutes to complete the written survey that you may receive in the mail after your visit with us. Thank you!             Your Updated Medication List - Protect others around you: Learn how to safely use, store and throw away your medicines at www.disposemymeds.org.          This list is accurate as of: 2/14/17  3:30 PM.  Always use your most recent med list.                   Brand Name Dispense Instructions for use    ASPIRIN PO            "

## 2017-02-14 NOTE — PROGRESS NOTES
Progress Note    SUBJECTIVE:  Darrel is a  46 YOF with a history of uterine fibroids who comes to clinic today to discuss abdominal complaints related to her periods, as well as to have her Mirena IUD removed. She notes that for the last 3 months she's had fevers/chills, abdominal pain and vomiting in the 3 days preceding her menstrual periods. The abdominal pain and vomiting are debilitating, but resolve after her periods begin. She has never had symptoms like this before. She has not had any menorrhalgia or changes in her menstrual cycle flow, which are otherwise regular. No spotting or bleeding between cycles. She tried using ASA on one occassion but threw it up.    For the IUD, she had a Merena IUD placed in , and would like to have it removed today. She had a transvaginal US on  which showed the IUD within the uterus, but an attempt to remove the IUD was unsuccessful.     Menstrual History:  Menstrual History 2017   LAST MENSTRUAL PERIOD 2017       Last    Lab Results   Component Value Date    PAP OTHER-NIL, See Result 2017     History of abnormal Pap smear: NO - age 30-65 PAP every 5 years with negative HPV co-testing recommended    Last   Lab Results   Component Value Date    HPV16 Negative 2017     Last   Lab Results   Component Value Date    HPV18 Negative 2017     Last   Lab Results   Component Value Date    HRHPV Negative 2017     Last Colonoscopy:  No results found for this or any previous visit.    HISTORY:  Prescription Medications as of 2017             ASPIRIN PO         Allergies   Allergen Reactions     Shellfish-Derived Products Anaphylaxis       There is no immunization history on file for this patient.    Obstetric History       T2      TAB0   SAB0   E0   M0   L2      No past medical history on file.  No past surgical history on file.  Family History   Problem Relation Age of Onset     Dementia Mother      CEREBROVASCULAR  "DISEASE Father      KIDNEY DISEASE Father      Social History     Social History     Marital status: Single     Spouse name: N/A     Number of children: N/A     Years of education: N/A     Social History Main Topics     Smoking status: Current Every Day Smoker     Types: Cigarettes     Smokeless tobacco: Not on file     Alcohol use Yes      Comment: socially      Drug use: Yes      Comment: marijuana      Sexual activity: Yes     Partners: Male     Birth control/ protection: Condom     Other Topics Concern     Not on file     Social History Narrative       Review of Systems     Genitourinary:  Negative for dysuria, vaginal discharge, voiding less frequently, menstrual changes and vaginal dryness.       EXAM:  Blood pressure 111/77, pulse 66, height 1.626 m (5' 4\"), weight 62.1 kg (136 lb 14.4 oz), last menstrual period 01/24/2017. Body mass index is 23.5 kg/(m^2).  General - pleasant female in no acute distress.    Pelvic Exam:  EG/BUS: Normal genital architecture without lesions, erythema or abnormal secretions Bartholin's, Urethra, [place]'s normal  Urethral meatus: normal  Urethra: no masses, tenderness, or scarring  Vagina: moist, pink, rugae with creamy, white and odorless  secretions  Cervix: no lesions. IUD strings visualized at cervical os.  Uterus: anteverted,  and nodular 9 wk size  Adnexa: Within normal limits and No masses, nodularity, tenderness    A/P  1. Dysmenorrhea: Although her abdominal pain/symptoms start prior to her menstrual periods, which is not classical for dysmenorrhea, we discussed that they may indeed be related to the increase in prostaglandin production that occurs with menstrual periods. Initially NDAIDs were discussed as a possible remedy, although given that we will place an IUD today (see below), NSAIDs may not be necessary, as the IUD is likely to resolve her symptoms.     2. IUD: We discussed the risks and benefits of various birth control options for her, and, given her status as " a current smoker, it was decided to place a new Mirena IUD. Risks and benefits of the Mirena were discussed, and it was placed.    I, Gasper Sadler MS3, scribed this note on behalf of Dr. Pantoja.    ASSESSMENT:  S/p IUD removal and re-insertion     PLAN:    Return to clinic in 4-6 weeks for string check.  MD Adriana